# Patient Record
Sex: MALE | Race: WHITE | NOT HISPANIC OR LATINO | Employment: UNEMPLOYED | URBAN - METROPOLITAN AREA
[De-identification: names, ages, dates, MRNs, and addresses within clinical notes are randomized per-mention and may not be internally consistent; named-entity substitution may affect disease eponyms.]

---

## 2018-10-17 DIAGNOSIS — D17.22 LIPOMA OF LEFT UPPER EXTREMITY: Primary | ICD-10-CM

## 2019-02-06 ENCOUNTER — OFFICE VISIT (OUTPATIENT)
Dept: SURGERY | Facility: CLINIC | Age: 49
End: 2019-02-06
Payer: MEDICAID

## 2019-02-06 VITALS
HEART RATE: 67 BPM | TEMPERATURE: 98 F | DIASTOLIC BLOOD PRESSURE: 78 MMHG | BODY MASS INDEX: 26.48 KG/M2 | WEIGHT: 185 LBS | HEIGHT: 70 IN | SYSTOLIC BLOOD PRESSURE: 124 MMHG

## 2019-02-06 DIAGNOSIS — D17.22 LIPOMA OF LEFT UPPER EXTREMITY: Primary | ICD-10-CM

## 2019-02-06 PROCEDURE — 99999 PR PBB SHADOW E&M-EST. PATIENT-LVL III: CPT | Mod: PBBFAC,,, | Performed by: SURGERY

## 2019-02-06 PROCEDURE — 99999 PR PBB SHADOW E&M-EST. PATIENT-LVL III: ICD-10-PCS | Mod: PBBFAC,,, | Performed by: SURGERY

## 2019-02-06 PROCEDURE — 99203 PR OFFICE/OUTPT VISIT, NEW, LEVL III, 30-44 MIN: ICD-10-PCS | Mod: S$PBB,,, | Performed by: SURGERY

## 2019-02-06 PROCEDURE — 99203 OFFICE O/P NEW LOW 30 MIN: CPT | Mod: S$PBB,,, | Performed by: SURGERY

## 2019-02-06 PROCEDURE — 99213 OFFICE O/P EST LOW 20 MIN: CPT | Mod: PBBFAC | Performed by: SURGERY

## 2019-02-06 RX ORDER — CLOTRIMAZOLE 1 %
CREAM (GRAM) TOPICAL
Refills: 5 | COMMUNITY
Start: 2018-11-16

## 2019-02-06 RX ORDER — EMTRICITABINE AND TENOFOVIR ALAFENAMIDE 200; 25 MG/1; MG/1
TABLET ORAL
Refills: 5 | COMMUNITY
Start: 2019-02-04

## 2019-02-06 RX ORDER — LITHIUM CARBONATE 300 MG/1
TABLET, FILM COATED, EXTENDED RELEASE ORAL
Refills: 1 | COMMUNITY
Start: 2019-01-09

## 2019-02-06 RX ORDER — CHLORHEXIDINE GLUCONATE ORAL RINSE 1.2 MG/ML
SOLUTION DENTAL
Refills: 0 | COMMUNITY
Start: 2018-11-20

## 2019-02-06 RX ORDER — QUETIAPINE FUMARATE 25 MG/1
TABLET, FILM COATED ORAL
Refills: 1 | COMMUNITY
Start: 2019-02-04

## 2019-02-06 RX ORDER — OMEPRAZOLE 20 MG/1
CAPSULE, DELAYED RELEASE ORAL
Refills: 5 | COMMUNITY
Start: 2019-02-04

## 2019-02-06 RX ORDER — ZOLPIDEM TARTRATE 10 MG/1
TABLET ORAL
Refills: 0 | COMMUNITY
Start: 2019-01-04

## 2019-02-06 RX ORDER — ZIPRASIDONE HYDROCHLORIDE 40 MG/1
CAPSULE ORAL
Refills: 1 | COMMUNITY
Start: 2019-01-08

## 2019-02-06 RX ORDER — DOLUTEGRAVIR SODIUM 50 MG/1
TABLET, FILM COATED ORAL
Refills: 5 | COMMUNITY
Start: 2019-02-04

## 2019-02-06 NOTE — PROGRESS NOTES
History & Physical    SUBJECTIVE:     History of Present Illness:  Patient is a 48 y.o. male with HIV presents with a left arm lipoma. Onset of symptoms was gradual starting 2 years ago with gradually increased in size since that time. Patient denies pain and is asymptomatic. States when he first noticed the mass it was about the size of a pea and has since grown. Denies paresthesia.      Chief Complaint   Patient presents with    Mass     left bicep       Review of patient's allergies indicates:  No Known Allergies    Current Outpatient Medications   Medication Sig Dispense Refill    chlorhexidine (PERIDEX) 0.12 % solution   0    clotrimazole (LOTRIMIN) 1 % cream   5    DESCOVY 200-25 mg Tab   5    lithium (LITHOBID) 300 MG CR tablet   1    omeprazole (PRILOSEC) 20 MG capsule   5    QUEtiapine (SEROQUEL) 25 MG Tab   1    TIVICAY 50 mg Tab   5    ziprasidone (GEODON) 40 MG Cap   1    zolpidem (AMBIEN) 10 mg Tab   0     No current facility-administered medications for this visit.        No past medical history on file.  No past surgical history on file.  No family history on file.  Social History     Tobacco Use    Smoking status: Not on file   Substance Use Topics    Alcohol use: Not on file    Drug use: Not on file        Review of Systems:  Review of Systems   Constitutional: Negative for activity change, chills, fatigue and fever.   HENT: Negative for congestion and sore throat.    Eyes: Negative for pain and redness.   Respiratory: Negative for cough and shortness of breath.    Cardiovascular: Negative for chest pain, palpitations and leg swelling.   Gastrointestinal: Negative for abdominal distention, abdominal pain, constipation, diarrhea, nausea and vomiting.   Genitourinary: Negative for flank pain and hematuria.   Musculoskeletal: Negative for back pain and gait problem.   Skin: Negative for rash and wound.   Neurological: Negative for light-headedness, numbness and headaches.   Hematological:  "Does not bruise/bleed easily.   Psychiatric/Behavioral: Negative for confusion. The patient is nervous/anxious.        OBJECTIVE:     Vital Signs (Most Recent)  Temp: 98.1 °F (36.7 °C) (02/06/19 0830)  Pulse: 67 (02/06/19 0830)  BP: 124/78 (02/06/19 0830)  5' 10" (1.778 m)  83.9 kg (185 lb)     Physical Exam:  Physical Exam   Constitutional: He is oriented to person, place, and time. He appears well-developed and well-nourished.   HENT:   Head: Normocephalic and atraumatic.   Eyes: EOM are normal. No scleral icterus.   Neck: Neck supple. No tracheal deviation present.   Cardiovascular: Normal rate and regular rhythm.   Pulmonary/Chest: Effort normal. No respiratory distress.   Abdominal: Soft. He exhibits no distension. There is no tenderness.   Musculoskeletal: He exhibits no edema.   2x3 cm mobile subcutaneous mass on the anterior left arm just below the elbow   Neurological: He is alert and oriented to person, place, and time.   Skin: Skin is warm and dry.   Psychiatric: He has a normal mood and affect.   Vitals reviewed.      Laboratory  Reviewed    Diagnostic Results:  None    ASSESSMENT/PLAN:     47 y/o male with a left arm lipoma    PLAN:    Will plan for excision in minor procedure room.   Will get consent the AM of the procedure    Anthony Luke M.D.  PGY 3      I have personally taken the history and examined this patient and agree with the resident's note as stated above.         George Kahn MD      "

## 2019-02-22 ENCOUNTER — TELEPHONE (OUTPATIENT)
Dept: SURGERY | Facility: CLINIC | Age: 49
End: 2019-02-22

## 2019-02-22 NOTE — TELEPHONE ENCOUNTER
Pt didn't realize that he was scheduled for 3/1-actually worked better for his schedule.  Pt did have questions in reference to getting a prescription for Ativan prior to procedure.  Will discuss request with Dr. Kahn .

## 2019-03-01 ENCOUNTER — OFFICE VISIT (OUTPATIENT)
Dept: SURGERY | Facility: CLINIC | Age: 49
End: 2019-03-01
Payer: MEDICAID

## 2019-03-01 VITALS
TEMPERATURE: 98 F | BODY MASS INDEX: 26.48 KG/M2 | DIASTOLIC BLOOD PRESSURE: 77 MMHG | SYSTOLIC BLOOD PRESSURE: 147 MMHG | HEART RATE: 74 BPM | WEIGHT: 185 LBS | HEIGHT: 70 IN

## 2019-03-01 DIAGNOSIS — D17.20 LIPOMA OF ARM: ICD-10-CM

## 2019-03-01 DIAGNOSIS — D17.20 LIPOMA OF FOREARM: Primary | ICD-10-CM

## 2019-03-01 PROCEDURE — 88304 TISSUE EXAM BY PATHOLOGIST: CPT | Performed by: PATHOLOGY

## 2019-03-01 PROCEDURE — 88304 TISSUE SPECIMEN TO PATHOLOGY, SURGERY: ICD-10-PCS | Mod: 26,,, | Performed by: PATHOLOGY

## 2019-03-01 PROCEDURE — 99213 OFFICE O/P EST LOW 20 MIN: CPT | Mod: PBBFAC,25 | Performed by: SURGERY

## 2019-03-01 PROCEDURE — 99499 NO LOS: ICD-10-PCS | Mod: S$PBB,,, | Performed by: SURGERY

## 2019-03-01 PROCEDURE — 25075 EXC, TUMOR SOFT TISSUE: ICD-10-PCS | Mod: 51,S$PBB,LT, | Performed by: SURGERY

## 2019-03-01 PROCEDURE — 99999 PR PBB SHADOW E&M-EST. PATIENT-LVL III: CPT | Mod: PBBFAC,,, | Performed by: SURGERY

## 2019-03-01 PROCEDURE — 24071 EXC ARM/ELBOW LES SC 3 CM/>: CPT | Mod: PBBFAC | Performed by: SURGERY

## 2019-03-01 PROCEDURE — 99999 PR PBB SHADOW E&M-EST. PATIENT-LVL III: ICD-10-PCS | Mod: PBBFAC,,, | Performed by: SURGERY

## 2019-03-01 PROCEDURE — 88304 TISSUE EXAM BY PATHOLOGIST: CPT | Mod: 26,,, | Performed by: PATHOLOGY

## 2019-03-01 PROCEDURE — 25075 EXC FOREARM LES SC < 3 CM: CPT | Mod: 51,S$PBB,LT, | Performed by: SURGERY

## 2019-03-01 PROCEDURE — 99499 UNLISTED E&M SERVICE: CPT | Mod: S$PBB,,, | Performed by: SURGERY

## 2019-03-01 PROCEDURE — 24071 EXC, TUMOR SOFT TISSUE: ICD-10-PCS | Mod: S$PBB,LT,, | Performed by: SURGERY

## 2019-03-01 NOTE — PROCEDURES
"Exc, Tumor Soft Tissue  Date/Time: 3/1/2019 9:23 AM  Performed by: George Kahn Jr., MD  Authorized by: George Kahn Jr., MD     Consent Done?:  Yes (Written)  Time out: Immediately prior to procedure a "time out" was called to verify the correct patient, procedure, equipment, support staff and site/side marked as required.      STAFF:  Assistants?: No    I was present for the entire procedure.  INDICATIONS:: lipoma.  LOCATION:  Body area:  Upper arm / elbowleft    PREP:  Patient was prepped and draped in the normal sterile fashion.Position:  Supine    ANESTHESIA:  Anesthesia:  Local infiltration  Local anesthetic:  Lidocaine 1% without epinephrine    PROCEDURE DETAILS:  Excision type:  Tumor  Excision depth:  Subcutaneous  Excision size (cm):  3  Scalpel size:  15  Incision type:  Single straight  Specimens?: Yes    Specimens submitted to pathology. (lipoma)  Hemostasis was obtained.  Estimated blood loss (cc):  1  Wound closure:  Simple  Wound repair size (cm):  3  Sutures:  4-0 Monocryl and 3-0 Vicryl  Sterile dressings:  Gauze, Mastisol, Steri-strips and Tegaderm  Post-op diagnosis: Same as pre-op diagnosis.  Needle, instrument, and sponge counts were correct.  Patient tolerated the procedure well with no immediate complications.  Post-operative instructions were provided for the patient.  Patient was discharged and will follow up for wound check and pathology results. and Patient was discharged and will follow up for wound check.  Exc, Tumor Soft Tissue  Date/Time: 3/1/2019 9:24 AM  Performed by: George Kahn Jr., MD  Authorized by: George Kahn Jr., MD     Consent Done?:  Yes (Written)  Time out: Immediately prior to procedure a "time out" was called to verify the correct patient, procedure, equipment, support staff and site/side marked as required.      STAFF:  Assistants?: No    I was present for the entire procedure.  INDICATIONS:: lipoma.  LOCATION:  Body area:  Lower arm / " wristleft    PREP:  Patient was prepped and draped in the normal sterile fashion.Position:  Supine    ANESTHESIA:  Anesthesia:  Local infiltration  Local anesthetic:  Lidocaine 1% without epinephrine    PROCEDURE DETAILS:  Excision type:  Tumor  Excision depth:  Subcutaneous  Excision size (cm):  0.5  Scalpel size:  15  Incision type:  Single straight  Specimens?: Yes    Specimens submitted to pathology. (lipoma)  Hemostasis was obtained.  Estimated blood loss (cc):  0.5  Wound closure:  Simple  Sutures:  4-0 Monocryl  Sterile dressings:  Gauze, Mastisol, Steri-strips and Tegaderm  Post-op diagnosis: Same as pre-op diagnosis.  Needle, instrument, and sponge counts were correct.  Patient tolerated the procedure well with no immediate complications.  Post-operative instructions were provided for the patient.  Patient was discharged and will follow up for wound check and pathology results. and Patient was discharged and will follow up for wound check.